# Patient Record
Sex: FEMALE | Race: WHITE
[De-identification: names, ages, dates, MRNs, and addresses within clinical notes are randomized per-mention and may not be internally consistent; named-entity substitution may affect disease eponyms.]

---

## 2018-06-15 ENCOUNTER — HOSPITAL ENCOUNTER (OUTPATIENT)
Dept: HOSPITAL 11 - JP.SDS | Age: 68
Discharge: HOME | End: 2018-06-15
Attending: SURGERY
Payer: MEDICARE

## 2018-06-15 DIAGNOSIS — K63.5: ICD-10-CM

## 2018-06-15 DIAGNOSIS — Z86.010: ICD-10-CM

## 2018-06-15 DIAGNOSIS — K21.9: ICD-10-CM

## 2018-06-15 DIAGNOSIS — Z88.5: ICD-10-CM

## 2018-06-15 DIAGNOSIS — I25.10: ICD-10-CM

## 2018-06-15 DIAGNOSIS — Z12.11: Primary | ICD-10-CM

## 2018-06-15 DIAGNOSIS — K62.1: ICD-10-CM

## 2018-06-15 DIAGNOSIS — K57.30: ICD-10-CM

## 2018-06-15 DIAGNOSIS — I10: ICD-10-CM

## 2018-06-15 DIAGNOSIS — Z88.1: ICD-10-CM

## 2018-06-15 PROCEDURE — 45380 COLONOSCOPY AND BIOPSY: CPT

## 2018-06-15 NOTE — OR
DATE OF PROCEDURE:  06/15/2018

 

PREOPERATIVE DIAGNOSIS:  History of colon polyps.

 

POSTOPERATIVE DIAGNOSES:  Diverticulosis; two small colon polyps--rectum and 60 
cm 

from the anal verge.

 

PROCEDURE:  Colonoscopy to the cecum with biopsy resection of two small colon 
polyps--

rectum and 60 cm from the anal verge.

 

SURGEON:  Lenny Coronado MD.

 

ANESTHESIA:  IV anesthesia with monitored anesthesia care.

 

INDICATION:  This 67-year-old white female is referred for a colonoscopy 
because of a

history of colon polyps.  She says her last colonoscopic exam was done four 
years ago.  I

counseled her for the procedure including risks and alternatives, and she gave 
her informed

consent to proceed.

 

PROCEDURE IN DETAIL:  The patient was placed in the left lateral decubitus 
position.  IV

anesthesia was administered by the Anesthesia Service.  Time-out was held.  A 
rectal exam

was performed, which was unremarkable.  The flexible video Olympus colonoscope 
was

introduced through her anus, up her rectum, out her colon all the way to the 
cecum.  En

route, at 60 cm from the anal verge, we saw a small polyp which was removed 
with a single

bite of the biopsy forceps.  Once the cecum was reached, the scope was slowly 
withdrawn

examining the mucosa throughout.  No additional mucosal abnormalities were 
noted until 

we reached the left colon.  Here and in the sigmoid colon, we saw several 
diverticula.  There

was no bleeding or inflammation associated with any of them.  In the rectum, we 
saw a small

polyp, which was removed with the biopsy forceps.  The scope was retroflexed in 
the rectum

with the distal rectum appearing unremarkable.  The scope was straightened and 
removed.  

She tolerated the procedure well.

 

 

 

 

Lenny Coronado MD

DD:  06/15/2018 08:18:08

DT:  06/15/2018 14:15:01

Job #:  [id number]

MTDD